# Patient Record
Sex: MALE | Race: OTHER | HISPANIC OR LATINO | URBAN - METROPOLITAN AREA
[De-identification: names, ages, dates, MRNs, and addresses within clinical notes are randomized per-mention and may not be internally consistent; named-entity substitution may affect disease eponyms.]

---

## 2021-04-30 ENCOUNTER — EMERGENCY (EMERGENCY)
Facility: HOSPITAL | Age: 56
LOS: 1 days | Discharge: HOME | End: 2021-04-30
Attending: EMERGENCY MEDICINE | Admitting: EMERGENCY MEDICINE
Payer: COMMERCIAL

## 2021-04-30 VITALS
HEIGHT: 68 IN | SYSTOLIC BLOOD PRESSURE: 154 MMHG | DIASTOLIC BLOOD PRESSURE: 76 MMHG | HEART RATE: 88 BPM | WEIGHT: 149.91 LBS | RESPIRATION RATE: 16 BRPM | TEMPERATURE: 98 F | OXYGEN SATURATION: 97 %

## 2021-04-30 VITALS
HEART RATE: 69 BPM | DIASTOLIC BLOOD PRESSURE: 61 MMHG | TEMPERATURE: 98 F | SYSTOLIC BLOOD PRESSURE: 127 MMHG | OXYGEN SATURATION: 98 %

## 2021-04-30 LAB
ACETONE SERPL-MCNC: NEGATIVE — SIGNIFICANT CHANGE UP
ALBUMIN SERPL ELPH-MCNC: 4.1 G/DL — SIGNIFICANT CHANGE UP (ref 3.3–5)
ALP SERPL-CCNC: 159 U/L — HIGH (ref 30–120)
ALT FLD-CCNC: 25 U/L DA — SIGNIFICANT CHANGE UP (ref 10–60)
AMYLASE P1 CFR SERPL: 27 U/L — SIGNIFICANT CHANGE UP (ref 25–125)
APPEARANCE UR: CLEAR — SIGNIFICANT CHANGE UP
AST SERPL-CCNC: 18 U/L — SIGNIFICANT CHANGE UP (ref 10–40)
BASOPHILS # BLD AUTO: 0.03 K/UL — SIGNIFICANT CHANGE UP (ref 0–0.2)
BASOPHILS NFR BLD AUTO: 0.7 % — SIGNIFICANT CHANGE UP (ref 0–2)
BILIRUB SERPL-MCNC: 0.7 MG/DL — SIGNIFICANT CHANGE UP (ref 0.2–1.2)
BILIRUB UR-MCNC: NEGATIVE — SIGNIFICANT CHANGE UP
BUN SERPL-MCNC: 19 MG/DL — SIGNIFICANT CHANGE UP (ref 7–23)
CALCIUM SERPL-MCNC: 9.1 MG/DL — SIGNIFICANT CHANGE UP (ref 8.4–10.5)
CHLORIDE SERPL-SCNC: 101 MMOL/L — SIGNIFICANT CHANGE UP (ref 96–108)
CO2 SERPL-SCNC: 26 MMOL/L — SIGNIFICANT CHANGE UP (ref 22–31)
COLOR SPEC: YELLOW — SIGNIFICANT CHANGE UP
CREAT SERPL-MCNC: 1.03 MG/DL — SIGNIFICANT CHANGE UP (ref 0.5–1.3)
DIFF PNL FLD: NEGATIVE — SIGNIFICANT CHANGE UP
EOSINOPHIL # BLD AUTO: 0.08 K/UL — SIGNIFICANT CHANGE UP (ref 0–0.5)
EOSINOPHIL NFR BLD AUTO: 1.8 % — SIGNIFICANT CHANGE UP (ref 0–6)
GLUCOSE BLDC GLUCOMTR-MCNC: 300 MG/DL — HIGH (ref 70–99)
GLUCOSE SERPL-MCNC: 471 MG/DL — CRITICAL HIGH (ref 70–99)
GLUCOSE UR QL: 1000 MG/DL
HCT VFR BLD CALC: 38 % — LOW (ref 39–50)
HGB BLD-MCNC: 13.4 G/DL — SIGNIFICANT CHANGE UP (ref 13–17)
IMM GRANULOCYTES NFR BLD AUTO: 0.5 % — SIGNIFICANT CHANGE UP (ref 0–1.5)
KETONES UR-MCNC: NEGATIVE — SIGNIFICANT CHANGE UP
LACTATE SERPL-SCNC: 0.7 MMOL/L — SIGNIFICANT CHANGE UP (ref 0.7–2)
LEUKOCYTE ESTERASE UR-ACNC: NEGATIVE — SIGNIFICANT CHANGE UP
LIDOCAIN IGE QN: 96 U/L — SIGNIFICANT CHANGE UP (ref 73–393)
LYMPHOCYTES # BLD AUTO: 1.14 K/UL — SIGNIFICANT CHANGE UP (ref 1–3.3)
LYMPHOCYTES # BLD AUTO: 25.9 % — SIGNIFICANT CHANGE UP (ref 13–44)
MAGNESIUM SERPL-MCNC: 1.9 MG/DL — SIGNIFICANT CHANGE UP (ref 1.6–2.6)
MCHC RBC-ENTMCNC: 29.8 PG — SIGNIFICANT CHANGE UP (ref 27–34)
MCHC RBC-ENTMCNC: 35.3 GM/DL — SIGNIFICANT CHANGE UP (ref 32–36)
MCV RBC AUTO: 84.4 FL — SIGNIFICANT CHANGE UP (ref 80–100)
MONOCYTES # BLD AUTO: 0.49 K/UL — SIGNIFICANT CHANGE UP (ref 0–0.9)
MONOCYTES NFR BLD AUTO: 11.1 % — SIGNIFICANT CHANGE UP (ref 2–14)
NEUTROPHILS # BLD AUTO: 2.65 K/UL — SIGNIFICANT CHANGE UP (ref 1.8–7.4)
NEUTROPHILS NFR BLD AUTO: 60 % — SIGNIFICANT CHANGE UP (ref 43–77)
NITRITE UR-MCNC: NEGATIVE — SIGNIFICANT CHANGE UP
NRBC # BLD: 0 /100 WBCS — SIGNIFICANT CHANGE UP (ref 0–0)
PH UR: 5 — SIGNIFICANT CHANGE UP (ref 5–8)
PLATELET # BLD AUTO: 258 K/UL — SIGNIFICANT CHANGE UP (ref 150–400)
POTASSIUM SERPL-MCNC: 4.2 MMOL/L — SIGNIFICANT CHANGE UP (ref 3.5–5.3)
POTASSIUM SERPL-SCNC: 4.2 MMOL/L — SIGNIFICANT CHANGE UP (ref 3.5–5.3)
PROT SERPL-MCNC: 6.9 G/DL — SIGNIFICANT CHANGE UP (ref 6–8.3)
PROT UR-MCNC: NEGATIVE MG/DL — SIGNIFICANT CHANGE UP
RAPID RVP RESULT: SIGNIFICANT CHANGE UP
RBC # BLD: 4.5 M/UL — SIGNIFICANT CHANGE UP (ref 4.2–5.8)
RBC # FLD: 12.2 % — SIGNIFICANT CHANGE UP (ref 10.3–14.5)
SARS-COV-2 RNA SPEC QL NAA+PROBE: SIGNIFICANT CHANGE UP
SODIUM SERPL-SCNC: 135 MMOL/L — SIGNIFICANT CHANGE UP (ref 135–145)
SP GR SPEC: 1.01 — SIGNIFICANT CHANGE UP (ref 1.01–1.02)
UROBILINOGEN FLD QL: NEGATIVE MG/DL — SIGNIFICANT CHANGE UP
WBC # BLD: 4.41 K/UL — SIGNIFICANT CHANGE UP (ref 3.8–10.5)
WBC # FLD AUTO: 4.41 K/UL — SIGNIFICANT CHANGE UP (ref 3.8–10.5)

## 2021-04-30 PROCEDURE — 93005 ELECTROCARDIOGRAM TRACING: CPT

## 2021-04-30 PROCEDURE — 83735 ASSAY OF MAGNESIUM: CPT

## 2021-04-30 PROCEDURE — 82009 KETONE BODYS QUAL: CPT

## 2021-04-30 PROCEDURE — 82150 ASSAY OF AMYLASE: CPT

## 2021-04-30 PROCEDURE — 85025 COMPLETE CBC W/AUTO DIFF WBC: CPT

## 2021-04-30 PROCEDURE — 99285 EMERGENCY DEPT VISIT HI MDM: CPT

## 2021-04-30 PROCEDURE — 99284 EMERGENCY DEPT VISIT MOD MDM: CPT | Mod: 25

## 2021-04-30 PROCEDURE — 83605 ASSAY OF LACTIC ACID: CPT

## 2021-04-30 PROCEDURE — 0225U NFCT DS DNA&RNA 21 SARSCOV2: CPT

## 2021-04-30 PROCEDURE — 93010 ELECTROCARDIOGRAM REPORT: CPT

## 2021-04-30 PROCEDURE — 83690 ASSAY OF LIPASE: CPT

## 2021-04-30 PROCEDURE — 96360 HYDRATION IV INFUSION INIT: CPT

## 2021-04-30 PROCEDURE — 80053 COMPREHEN METABOLIC PANEL: CPT

## 2021-04-30 PROCEDURE — 71046 X-RAY EXAM CHEST 2 VIEWS: CPT | Mod: 26

## 2021-04-30 PROCEDURE — 82962 GLUCOSE BLOOD TEST: CPT

## 2021-04-30 PROCEDURE — 36415 COLL VENOUS BLD VENIPUNCTURE: CPT

## 2021-04-30 PROCEDURE — 81003 URINALYSIS AUTO W/O SCOPE: CPT

## 2021-04-30 PROCEDURE — 71046 X-RAY EXAM CHEST 2 VIEWS: CPT

## 2021-04-30 RX ORDER — SODIUM CHLORIDE 9 MG/ML
2000 INJECTION INTRAMUSCULAR; INTRAVENOUS; SUBCUTANEOUS ONCE
Refills: 0 | Status: COMPLETED | OUTPATIENT
Start: 2021-04-30 | End: 2021-04-30

## 2021-04-30 RX ORDER — METFORMIN HYDROCHLORIDE 850 MG/1
1 TABLET ORAL
Qty: 60 | Refills: 0
Start: 2021-04-30 | End: 2021-05-29

## 2021-04-30 RX ORDER — METFORMIN HYDROCHLORIDE 850 MG/1
500 TABLET ORAL ONCE
Refills: 0 | Status: COMPLETED | OUTPATIENT
Start: 2021-04-30 | End: 2021-04-30

## 2021-04-30 RX ORDER — DEXTROSE 50 % IN WATER 50 %
4 SYRINGE (ML) INTRAVENOUS
Qty: 30 | Refills: 0
Start: 2021-04-30

## 2021-04-30 RX ADMIN — SODIUM CHLORIDE 2000 MILLILITER(S): 9 INJECTION INTRAMUSCULAR; INTRAVENOUS; SUBCUTANEOUS at 18:55

## 2021-04-30 RX ADMIN — METFORMIN HYDROCHLORIDE 500 MILLIGRAM(S): 850 TABLET ORAL at 19:25

## 2021-04-30 RX ADMIN — SODIUM CHLORIDE 1000 MILLILITER(S): 9 INJECTION INTRAMUSCULAR; INTRAVENOUS; SUBCUTANEOUS at 16:44

## 2021-04-30 NOTE — ED ADULT NURSE NOTE - OBJECTIVE STATEMENT
54 yo male presents to the ED with complaints  of polyuria and polydipsiax2 months, pt POC BG is 411, pt denies any weakness, SOB, abdominal pain nausea and or vomiting   at this time.

## 2021-04-30 NOTE — ED PROVIDER NOTE - PATIENT PORTAL LINK FT
You can access the FollowMyHealth Patient Portal offered by [facility] by registering at the following website: [email or web address]. By joining [employer]’s FollowMyHealth portal, you will also be able to view your health information using other applications (apps) compatible with our system.

## 2021-04-30 NOTE — ED PROVIDER NOTE - NSFOLLOWUPINSTRUCTIONS_ED_ALL_ED_FT
you must check your blood glucose every 8 hours  Metformin 500 mg one pill twice daily  stay well hydrated  follow up with your doctor or the medical clinic at [facility]    Type 2 Diabetes in Adults: New Diagnosis    WHAT YOU NEED TO KNOW:    What is type 2 diabetes? Type 2 diabetes is a disease that affects how your body uses glucose (sugar). Normally, when the blood sugar level increases, the pancreas makes more insulin. Insulin helps move sugar out of the blood so it can be used for energy. Type 2 diabetes develops because either the body cannot make enough insulin, or it cannot use the insulin correctly. Type 2 diabetes can be controlled to prevent damage to your heart, blood vessels, and other organs.    What increases my risk for type 2 diabetes?   •Obesity      •Physical inactivity      •Older age      •High blood pressure or high cholesterol      •A history of heart disease, gestational diabetes, or polycystic ovary syndrome      •A family member with diabetes      •Being , , ,  American, or       What are the signs and symptoms of type 2 diabetes? You may have high blood sugar levels for a long time before symptoms appear. You may have any of the following:  •Numbness in your fingers or toes      •Blurred vision      •Frequent urination      •More hunger or thirst than usual      How is type 2 diabetes diagnosed? You may need tests to check for type 2 diabetes starting at age 45. You may need any of the following:  •An A1c test shows the average amount of sugar in your blood over the past 2 to 3 months. Your care team provider will tell you the A1c level that is right for you.      •A fasting plasma glucose test is when your blood sugar level is tested after you have not eaten for 8 hours.      •A 2-hour plasma glucose test starts with a blood sugar level check after you have not eaten for 8 hours. You are then given a glucose drink. Your blood sugar level is checked after 2 hours.      •A random glucose test may be done any time of day, no matter how long ago you ate.      How is type 2 diabetes treated? The goal of treatment is to prevent or delay complications of diabetes. Complications may include heart or kidney disease. Treatment includes eating healthy foods and being active. You may also need insulin or other medicine to help control blood sugar levels. You may need medicine to lower your risk for heart disease. An example is medicine to lower or control your cholesterol.    What is diabetes education? Diabetes education will start right away. Members of your diabetes care team will teach you the following:  •About nutrition: A dietitian will help you make a meal plan to keep your blood sugar level steady. You will learn how food affects your blood sugar levels. You will also learn to keep track of sugar and starchy foods (carbohydrates). Do not skip meals. Your blood sugar level may drop too low if you have taken diabetes medicine and do not eat. You may be taught to use the plate method when eating. The plate method will help with portion control. With the plate method, ½ of your plate contains vegetables. The other half is divided so that ¼ contains protein or meat, and ¼ contains starches, such as potatoes.  Plate Method           •Physical activity and diabetes: You will learn why physical activity, such as walking, is important. You and your care team provider will make a plan for your activity. Your care team provider will tell you what a healthy weight will be for you. He or she will help you make a plan to get to that weight and stay there. Maintain a healthy weight to help delay or prevent complications of diabetes.  [employer] Walking for Exercise           •How to check your blood sugar level: You will learn what your blood sugar level should be. You will be given information on when to check your blood sugar level. You will learn what to do if your level is too high or too low. Write down the times of your checks and your levels. Take them to all follow-up appointments.      •About diabetes medicine: Oral diabetes medicine may be given to help control your blood sugar levels. Your healthcare provider will teach you how and when to take your diabetes medicine. You will also be taught about side effects oral diabetes medicine can cause.      •If you need insulin: Insulin may be added if oral diabetes medicine becomes less effective over time. You and your family members will be taught how to draw up and give insulin. You will learn how much insulin you need and what time to inject insulin. You will be taught when to not give insulin. You will also be taught what to do if your blood sugar level drops too low. This may happen if you take insulin and do not eat the right amount of carbohydrates. Your education team will also teach you how to dispose of needles and syringes.      What else can I do to manage type 2 diabetes?   •Talk to your care team if you have increased stress about your diagnosis. When you feel stressed about your diagnosis, it can cause you not to take care of yourself properly. Your care team can help by offering tips about self-care. Your care team may suggest you talk to a mental health provider. The provider can listen and offer help with self-care issues.      •Check your feet each day for sores. Wear shoes and socks that fit correctly. Do not trim your toenails. Go to a podiatrist. Ask your care team for more information about foot care.  Diabetic Foot Care           •Do not smoke. Nicotine and other chemicals in cigarettes and cigars can cause lung damage and make diabetes harder to manage. Ask your care team provider for information if you currently smoke and need help to quit. E-cigarettes or smokeless tobacco still contain nicotine. Talk to your care team provider before you use these products.      •Drink water instead of sugary drinks such as soda and fruit juices. Sugary drinks cause high blood sugar and cause an increase in your weight. Your healthcare provider may tell you that diet drinks do not help with weight loss.      •Know the risks if you choose to drink alcohol. Alcohol can cause your blood sugar levels to be low if you use insulin. Alcohol can cause high blood sugar levels and weight gain if you drink too much. A drink of alcohol is 12 ounces of beer, 5 ounces of wine, or 1½ ounces of liquor.      •Check your blood pressure as directed. You may have high blood pressure when you have type 2 diabetes. Talk to your care team provider about your blood pressure goals. Together you can create a plan to lower your blood pressure if needed and keep it in a healthy range. The plan may include lifestyle changes or medicines. A normal blood pressure is 119/79 or lower. A normal blood pressure can help prevent or delay certain complications from diabetes. Examples include retinopathy (eye damage) and kidney damage.  How to take a Blood Pressure           •Wear medical alert identification. Wear medical alert jewelry or carry a card that says you have diabetes. Ask your care team provider where to get these items.  Medical Alert Jewelry           •Ask about vaccines. You have a higher risk for serious illness if you get the flu, pneumonia, or hepatitis. Ask your care team provider if you should get a flu, pneumonia, or hepatitis B vaccine, and when to get the vaccine.      What are the risks of type 2 diabetes? Diabetes that is not controlled can damage your nerves, veins, and arteries. Your risk for dementia increases faster the longer your diabetes is not controlled. High blood sugar levels may damage other body tissues and organs over time. Damage to arteries may increase your risk for heart attack and stroke. Nerve damage may also lead to other heart, stomach, and nerve problems. Diabetes can become life-threatening if it is not controlled.    Have someone call your local emergency number (911 in the [place]) if:   •You have any of the following signs of a stroke: ?Numbness or drooping on one side of your face       ?Weakness in an arm or leg      ?Confusion or difficulty speaking      ?Dizziness, a severe headache, or vision loss      •You have any of the following signs of a heart attack: ?Squeezing, pressure, or pain in your chest      ?You may also have any of the following: ?Discomfort or pain in your back, neck, jaw, stomach, or arm      ?Shortness of breath      ?Nausea or vomiting      ?Lightheadedness or a sudden cold sweat        •You have trouble breathing.      When should I seek immediate care?   •You have trouble breathing.      •You have severe abdominal pain.      •You vomit for more than 2 hours.      •You have trouble staying awake or focusing.      •You are shaking or sweating.      •You feel weak or more tired than usual.      •You have blurred or double vision.      •Your breath has a fruity, sweet smell.      When should I call my doctor or care team?   •Your arms and legs are swollen.      •You have an upset stomach and cannot eat the foods on your meal plan.      •You feel dizzy, have headaches, or are easily irritated.      •Your skin is red, warm, dry, or swollen.      •You have a wound that does not heal.      •You have numbness in your arms or legs.      •You have trouble coping with your illness, or you feel anxious or depressed.      •You have questions or concerns about your condition or care.      CARE AGREEMENT:    You have the right to help plan your care. Learn about your health condition and how it may be treated. Discuss treatment options with your healthcare providers to decide what care you want to receive. You always have the right to refuse treatment.

## 2021-04-30 NOTE — ED PROVIDER NOTE - NSFOLLOWUPCLINICS_GEN_ALL_ED_FT
[facility] - Primary Care  Primary Care  [address] [place] [address]  Phone: [phone]  Fax:     [facility] Endocrinology  Endocrinology  [address] [place] [address]  Phone: [phone]  Fax:     [facility] [place]  Internal Medicine  [address] [place] [address]  Phone: [phone]  Fax: [phone]

## 2021-04-30 NOTE — ED PROVIDER NOTE - OBJECTIVE STATEMENT
56 y/o male with no significant PMHx presents to the ED c/o urinary frequency x last couple of months. Pt also reports excessive thirst that causes him to drink more water often. Pt states that he has been unable to sleep because he has to get up every 45 minutes to urinate. Denies fevers, chills. Nonsmoker. No ETOH use. No illicit drug use. NKDA. Pt does not have a PCP to follow up with. No hx of DM, no family hx of DM.

## 2021-05-03 PROBLEM — Z78.9 OTHER SPECIFIED HEALTH STATUS: Chronic | Status: ACTIVE | Noted: 2021-04-30

## 2021-05-19 ENCOUNTER — LABORATORY RESULT (OUTPATIENT)
Age: 56
End: 2021-05-19

## 2021-05-19 ENCOUNTER — APPOINTMENT (OUTPATIENT)
Dept: INTERNAL MEDICINE | Facility: CLINIC | Age: 56
End: 2021-05-19
Payer: COMMERCIAL

## 2021-05-19 ENCOUNTER — OUTPATIENT (OUTPATIENT)
Dept: OUTPATIENT SERVICES | Facility: HOSPITAL | Age: 56
LOS: 1 days | End: 2021-05-19
Payer: SELF-PAY

## 2021-05-19 VITALS
OXYGEN SATURATION: 97 % | HEART RATE: 81 BPM | BODY MASS INDEX: 22.13 KG/M2 | WEIGHT: 146 LBS | DIASTOLIC BLOOD PRESSURE: 56 MMHG | SYSTOLIC BLOOD PRESSURE: 88 MMHG | HEIGHT: 68 IN

## 2021-05-19 DIAGNOSIS — Z72.89 OTHER PROBLEMS RELATED TO LIFESTYLE: ICD-10-CM

## 2021-05-19 DIAGNOSIS — E11.65 TYPE 2 DIABETES MELLITUS WITH HYPERGLYCEMIA: ICD-10-CM

## 2021-05-19 DIAGNOSIS — Z82.49 FAMILY HISTORY OF ISCHEMIC HEART DISEASE AND OTHER DISEASES OF THE CIRCULATORY SYSTEM: ICD-10-CM

## 2021-05-19 DIAGNOSIS — Z87.891 PERSONAL HISTORY OF NICOTINE DEPENDENCE: ICD-10-CM

## 2021-05-19 DIAGNOSIS — I10 ESSENTIAL (PRIMARY) HYPERTENSION: ICD-10-CM

## 2021-05-19 PROBLEM — Z00.00 ENCOUNTER FOR PREVENTIVE HEALTH EXAMINATION: Status: ACTIVE | Noted: 2021-05-19

## 2021-05-19 PROCEDURE — 87389 HIV-1 AG W/HIV-1&-2 AB AG IA: CPT

## 2021-05-19 PROCEDURE — 99204 OFFICE O/P NEW MOD 45 MIN: CPT | Mod: GC

## 2021-05-19 PROCEDURE — 83036 HEMOGLOBIN GLYCOSYLATED A1C: CPT

## 2021-05-19 PROCEDURE — 80061 LIPID PANEL: CPT

## 2021-05-19 PROCEDURE — G0463: CPT | Mod: 25

## 2021-05-19 PROCEDURE — 86803 HEPATITIS C AB TEST: CPT

## 2021-05-19 PROCEDURE — 82306 VITAMIN D 25 HYDROXY: CPT

## 2021-05-19 RX ORDER — METFORMIN HYDROCHLORIDE 1000 MG/1
1000 TABLET, COATED ORAL
Qty: 180 | Refills: 3 | Status: ACTIVE | COMMUNITY
Start: 2021-05-19 | End: 1900-01-01

## 2021-05-20 LAB
24R-OH-CALCIDIOL SERPL-MCNC: 25.1 NG/ML — LOW (ref 30–80)
A1C WITH ESTIMATED AVERAGE GLUCOSE RESULT: 12.8 % — HIGH (ref 4–5.6)
CHOLEST SERPL-MCNC: 152 MG/DL — SIGNIFICANT CHANGE UP
ESTIMATED AVERAGE GLUCOSE: 321 MG/DL — HIGH (ref 68–114)
HCV AB S/CO SERPL IA: 0.09 S/CO — SIGNIFICANT CHANGE UP (ref 0–0.99)
HCV AB SERPL-IMP: SIGNIFICANT CHANGE UP
HDLC SERPL-MCNC: 70 MG/DL — SIGNIFICANT CHANGE UP
HIV 1+2 AB+HIV1 P24 AG SERPL QL IA: SIGNIFICANT CHANGE UP
LIPID PNL WITH DIRECT LDL SERPL: 66 MG/DL — SIGNIFICANT CHANGE UP
NON HDL CHOLESTEROL: 82 MG/DL — SIGNIFICANT CHANGE UP
TRIGL SERPL-MCNC: 83 MG/DL — SIGNIFICANT CHANGE UP

## 2021-05-21 LAB — HBA1C MFR BLD HPLC: 12.4

## 2021-05-21 RX ORDER — ADHESIVE TAPE 3"X 2.3 YD
50 MCG TAPE, NON-MEDICATED TOPICAL
Qty: 90 | Refills: 0 | Status: ACTIVE | COMMUNITY
Start: 2021-05-21 | End: 1900-01-01

## 2021-05-21 NOTE — ASSESSMENT
[FreeTextEntry1] : Patient is a 55 year old male with newly diagnosed T2DM presenting for establishment of care. \par \par # Uncontrolled T2DM\par - POC Hemoglobin A1C 12.4\par - Plan to increase PO Metformin from 500 mg BID to 1,000 mg BID\par - Follow up with CMP and micro/albumin\par -  about diet and physical activity\par - Endocrinology referral provided per patient request\par \par # Health Care Maintenance\par - Follow up with CBC, CMP, LFTs, vitamin D\par - Provided GI referral for colonoscopy\par - Received COVID-19 vaccine (Moderna 2nd dose May 8th)\par - Deferred TDAP vaccine today

## 2021-05-21 NOTE — PHYSICAL EXAM
[Normal] : affect was normal and insight and judgment were intact [Comprehensive Foot Exam Normal] : Right and left foot were examined and both feet are normal. No ulcers in either foot. Toes are normal and with full ROM.  Normal tactile sensation with monofilament testing throughout both feet [de-identified] : n

## 2021-05-21 NOTE — HISTORY OF PRESENT ILLNESS
[FreeTextEntry1] : Establishment of care [de-identified] : Patient is a 55 year old male with newly diagnosed T2DM on Metformin 500 mg BID, presents for CPE. Patient reported to the ED on April 30th, 2021 for increase urinary frequency and thirst. Lab results show serum glucose 471 and POCT glucose 300. Patient was started on Metformin 500 mg BID and currently does not report side effects. Patient reports improved urinary frequency (Q30 minutes nightly to 2-3x/night). Patient reports acute bilateral visual blurriness x 2 weeks. Patient saw ophthalmology yesterday with suggestions of follow up Q6 months. Patient reports 30+ lb weight loss during a 6 month period.\par Denies fever, chills, headache, sore throat, chest pain, SOB, abdominal pain, changes in bowel habits, leg swelling.\par \par Social: occasionally drinks 2 beers per month\par Diet: salads, avocado, increase protein, decrease sugar intake\par Occupation: employed at [employer]

## 2021-05-21 NOTE — REVIEW OF SYSTEMS
[Recent Change In Weight] : ~T recent weight change [Vision Problems] : vision problems [Nocturia] : nocturia [Negative] : Heme/Lymph [Fever] : no fever [Chills] : no chills [Fatigue] : no fatigue [Night Sweats] : no night sweats [FreeTextEntry2] : 30 lb weight loss in 6 months [FreeTextEntry3] : bilateral visual blurriness x 2weeks

## 2021-06-01 DIAGNOSIS — E11.65 TYPE 2 DIABETES MELLITUS WITH HYPERGLYCEMIA: ICD-10-CM

## 2021-06-01 DIAGNOSIS — Z82.49 FAMILY HISTORY OF ISCHEMIC HEART DISEASE AND OTHER DISEASES OF THE CIRCULATORY SYSTEM: ICD-10-CM

## 2021-06-01 DIAGNOSIS — Z72.89 OTHER PROBLEMS RELATED TO LIFESTYLE: ICD-10-CM

## 2021-06-01 DIAGNOSIS — Z87.891 PERSONAL HISTORY OF NICOTINE DEPENDENCE: ICD-10-CM

## 2022-10-10 NOTE — ED PROVIDER NOTE - TOBACCO USE
Quality 431: Preventive Care And Screening: Unhealthy Alcohol Use - Screening: Patient not identified as an unhealthy alcohol user when screened for unhealthy alcohol use using a systematic screening method
Quality 110: Preventive Care And Screening: Influenza Immunization: Influenza immunization was not ordered or administered, reason not given
Quality 226: Preventive Care And Screening: Tobacco Use: Screening And Cessation Intervention: Patient screened for tobacco use and is an ex/non-smoker
Detail Level: Detailed
Quality 130: Documentation Of Current Medications In The Medical Record: Current Medications with Name, Dosage, Frequency, or Route not Documented, Reason not Given
Never smoker

## 2025-02-03 NOTE — ED ADULT NURSE NOTE - DISCHARGE DATE/TIME
*Recommend alternating ice (for 10 min) and heat (for 30 min), 3-4 times a day as able/needed.  *Use the Tylenol as needed for discomfort.  *Please keep track of your blood pressure. Check it 2-3 days a week. Write down your results and bring them with you to your next office visit for review. We'd like you to stay < 140/90, and ideally < 130/80. Let us know if you find yourself above this routinely.     30-Apr-2021 19:21